# Patient Record
Sex: FEMALE | Race: WHITE
[De-identification: names, ages, dates, MRNs, and addresses within clinical notes are randomized per-mention and may not be internally consistent; named-entity substitution may affect disease eponyms.]

---

## 2021-10-04 ENCOUNTER — HOSPITAL ENCOUNTER (EMERGENCY)
Dept: HOSPITAL 56 - MW.ED | Age: 21
Discharge: HOME | End: 2021-10-04
Payer: COMMERCIAL

## 2021-10-04 DIAGNOSIS — E28.2: Primary | ICD-10-CM

## 2021-10-04 LAB
BUN SERPL-MCNC: 10 MG/DL (ref 7–18)
CHLORIDE SERPL-SCNC: 102 MMOL/L (ref 98–107)
CO2 SERPL-SCNC: 25.7 MMOL/L (ref 21–32)
GLUCOSE SERPL-MCNC: 160 MG/DL (ref 74–106)
LIPASE SERPL-CCNC: 36 U/L (ref 73–393)
POTASSIUM SERPL-SCNC: 3.9 MMOL/L (ref 3.5–5.1)
SODIUM SERPL-SCNC: 141 MMOL/L (ref 136–145)

## 2021-10-04 NOTE — EDM.PDOC
<Sandip Tucker - Last Filed: 10/04/21 05:07>





ED HPI GENERAL MEDICAL PROBLEM





- General


Chief Complaint: Abdominal Pain


Stated Complaint: SEVERE ABDOMINAL PAIN


Time Seen by Provider: 10/04/21 03:33


Source of Information: Reports: Patient


History Limitations: Reports: No Limitations





- History of Present Illness


INITIAL COMMENTS - FREE TEXT/NARRATIVE: 





Patient is a 20-year-old female presents today for right lower abdominal pain.  

Patient states that the pain started around 11 AM is been constant.  Patient 

states the pain is not made better or worse with anything.  But it did get worse

when she was laying flat.  Patient states she is having regular bowel movements 

urinary without issues and tolerating p.o.  She denies any other complaints.


  ** Right Lower Abdomen


Pain Score (Numeric/FACES): 7





- Related Data


                                    Allergies











Allergy/AdvReac Type Severity Reaction Status Date / Time


 


No Known Allergies Allergy   Verified 10/04/21 03:28











Home Meds: 


                                    Home Meds





. [No Known Home Meds]  10/04/21 [History]











ED ROS GENERAL





- Review of Systems


Review Of Systems: See Below


Constitutional: Reports: No Symptoms


HEENT: Reports: No Symptoms


Respiratory: Reports: No Symptoms


Cardiovascular: Reports: No Symptoms


Endocrine: Reports: No Symptoms


GI/Abdominal: Reports: Abdominal Pain


: Reports: No Symptoms


Musculoskeletal: Reports: No Symptoms


Skin: Reports: No Symptoms


Neurological: Reports: No Symptoms


Psychiatric: Reports: No Symptoms


Hematologic/Lymphatic: Reports: No Symptoms


Immunologic: Reports: No Symptoms





ED EXAM, GI/ABD





- Physical Exam


Exam: See Below


Exam Limited By: No Limitations


General Appearance: Alert, WD/WN, No Apparent Distress


Eyes: Bilateral: EOMI


Ears: Normal External Exam


Throat/Mouth: Normal Inspection


Head: Atraumatic


Neck: Normal Inspection


Respiratory/Chest: No Respiratory Distress, Lungs Clear, Normal Breath Sounds


Cardiovascular: Normal Peripheral Pulses, Regular Rate, Rhythm


GI/Abdominal Exam: Normal Bowel Sounds, Soft, Non-Tender


Extremities: Normal Inspection, Normal Range of Motion


Neurological: Alert, Oriented





Course





- Re-Assessments/Exams


Free Text/Narrative Re-Assessment/Exam: 





10/04/21 05:07


Patient has PCO S to have a good amount of pain.  We will obtain ultrasound as 7

 AM when the techs are here.  Patient remains vitally stable.





Departure





- Departure


Disposition: Home, Self-Care 01


Clinical Impression: 


 Polycystic ovarian disease








- Discharge Information


Instructions:  Polycystic Ovary Syndrome


Referrals: 


Porfirio Guevara MD [Primary Care Provider] - 


Forms:  ED Department Discharge


Additional Instructions: 


The following information is given to patients seen in the emergency department 

who are being discharged to home. This information is to outline your options 

for follow-up care. We provide all patients seen in our emergency department 

with a follow-up referral.





The need for follow-up, as well as the timing and circumstances, are variable 

depending upon the specifics of your emergency department visit.





If you don't have a primary care physician on staff, we will provide you with a 

referral. We always advise you to contact your personal physician following an 

emergency department visit to inform them of the circumstance of the visit and 

for follow-up with them and/or the need for any referrals to a consulting 

specialist.





The emergency department will also refer you to a specialist when appropriate. 

This referral assures that you have the opportunity for follow-up care with a 

specialist. All of these measure are taken in an effort to provide you with 

optimal care, which includes your follow-up.





Under all circumstances we always encourage you to contact your private 

physician who remains a resource for coordinating your care. When calling for 

follow-up care, please make the office aware that this follow-up is from your 

recent emergency room visit. If for any reason you are refused follow-up, please

contact the [facility] Emergency Department

at [phone] and asked to speak to the emergency department charge nurse.





Please follow up with your primary care physician. If you do not have a primary 

care physician, see below:


[facility]


[address] [place] [id number] [phone]





[facility]


[address] [place], [place] [id number] [phone]








[facility]


[address], [place] [address]


Phone: [phone]


Fax: [phone]








- Assessment/Plan


Plan: 





Patient is a 20-year-old female who presents today for lower right side 

abdominal pain.  Patient has no abdominal tenderness on exam patient looks well 

tolerating p.o. not febrile.  Will obtain labs UA and reassess patient.





<Lam Tamez - Last Filed: 10/04/21 10:15>





ED ROS GENERAL





- Review of Systems


Review Of Systems: Comprehensive ROS is negative, except as noted in HPI.





ED EXAM, GI/ABD





- Physical Exam


Exam: See Below





Course





- Vital Signs


Last Recorded V/S: 


                                Last Vital Signs











Temp  97.8 F   10/04/21 03:29


 


Pulse  112 H  10/04/21 03:29


 


Resp  17   10/04/21 03:29


 


BP  132/86   10/04/21 03:29


 


Pulse Ox  97   10/04/21 03:29














- Orders/Labs/Meds


Labs: 


                                Laboratory Tests











  10/04/21 10/04/21 10/04/21 Range/Units





  03:30 03:30 04:30 


 


WBC  12.92 H    (4.0-11.0)  K/uL


 


RBC  4.18 L    (4.30-5.90)  M/uL


 


Hgb  12.3    (12.0-16.0)  g/dL


 


Hct  37.3    (36.0-46.0)  %


 


MCV  89.2    (80.0-98.0)  fL


 


MCH  29.4    (27.0-32.0)  pg


 


MCHC  33.0    (31.0-37.0)  g/dL


 


RDW Std Deviation  46.7    (28.0-62.0)  fl


 


RDW Coeff of Enriqueta  14    (11.0-15.0)  %


 


Plt Count  240    (150-400)  K/uL


 


MPV  8.60    (7.40-12.00)  fL


 


Neut % (Auto)  78.5    (48.0-80.0)  %


 


Lymph % (Auto)  16.3    (16.0-40.0)  %


 


Mono % (Auto)  4.6    (0.0-15.0)  %


 


Eos % (Auto)  0.5    (0.0-7.0)  %


 


Baso % (Auto)  0.1    (0.0-1.5)  %


 


Neut # (Auto)  10.2 H    (1.4-5.7)  K/uL


 


Lymph # (Auto)  2.1    (0.6-2.4)  K/uL


 


Mono # (Auto)  0.6    (0.0-0.8)  K/uL


 


Eos # (Auto)  0.1    (0.0-0.7)  K/uL


 


Baso # (Auto)  0.0    (0.0-0.1)  K/uL


 


Nucleated RBC %  0.0    /100WBC


 


Nucleated RBCs #  0    K/uL


 


Sodium   141   (136-145)  mmol/L


 


Potassium   3.9   (3.5-5.1)  mmol/L


 


Chloride   102   ()  mmol/L


 


Carbon Dioxide   25.7   (21.0-32.0)  mmol/L


 


BUN   10   (7.0-18.0)  mg/dL


 


Creatinine   0.8   (0.6-1.0)  mg/dL


 


Est Cr Clr Drug Dosing   97.67   mL/min


 


Estimated GFR (MDRD)   > 60.0   ml/min


 


Glucose   160 H   ()  mg/dL


 


Calcium   8.0 L   (8.5-10.1)  mg/dL


 


Phosphorus   3.8   (2.6-4.7)  mg/dL


 


Magnesium   1.9   (1.8-2.4)  mg/dL


 


Total Bilirubin   0.8   (0.2-1.0)  mg/dL


 


AST   19   (15-37)  IU/L


 


ALT   41   (14-63)  IU/L


 


Alkaline Phosphatase   117 H   ()  U/L


 


Total Protein   7.8   (6.4-8.2)  g/dL


 


Albumin   3.9   (3.4-5.0)  g/dL


 


Globulin   3.9   (2.6-4.0)  g/dL


 


Albumin/Globulin Ratio   1.0   (0.9-1.6)  


 


Lipase   36 L   ()  U/L


 


Urine Color     


 


Urine Appearance     


 


Urine pH     (5.0-8.0)  


 


Ur Specific Gravity     (1.001-1.035)  


 


Urine Protein     (NEGATIVE)  mg/dL


 


Urine Glucose (UA)     (NEGATIVE)  mg/dL


 


Urine Ketones     (NEGATIVE)  mg/dL


 


Urine Occult Blood     (NEGATIVE)  


 


Urine Nitrite     (NEGATIVE)  


 


Urine Bilirubin     (NEGATIVE)  


 


Urine Ictotest     


 


Urine Urobilinogen     (<2.0)  EU/dL


 


Ur Leukocyte Esterase     (NEGATIVE)  


 


Urine HCG, Qual    NEGATIVE  (NEGATIVE)  














  10/04/21 Range/Units





  04:30 


 


WBC   (4.0-11.0)  K/uL


 


RBC   (4.30-5.90)  M/uL


 


Hgb   (12.0-16.0)  g/dL


 


Hct   (36.0-46.0)  %


 


MCV   (80.0-98.0)  fL


 


MCH   (27.0-32.0)  pg


 


MCHC   (31.0-37.0)  g/dL


 


RDW Std Deviation   (28.0-62.0)  fl


 


RDW Coeff of Enriqueta   (11.0-15.0)  %


 


Plt Count   (150-400)  K/uL


 


MPV   (7.40-12.00)  fL


 


Neut % (Auto)   (48.0-80.0)  %


 


Lymph % (Auto)   (16.0-40.0)  %


 


Mono % (Auto)   (0.0-15.0)  %


 


Eos % (Auto)   (0.0-7.0)  %


 


Baso % (Auto)   (0.0-1.5)  %


 


Neut # (Auto)   (1.4-5.7)  K/uL


 


Lymph # (Auto)   (0.6-2.4)  K/uL


 


Mono # (Auto)   (0.0-0.8)  K/uL


 


Eos # (Auto)   (0.0-0.7)  K/uL


 


Baso # (Auto)   (0.0-0.1)  K/uL


 


Nucleated RBC %   /100WBC


 


Nucleated RBCs #   K/uL


 


Sodium   (136-145)  mmol/L


 


Potassium   (3.5-5.1)  mmol/L


 


Chloride   ()  mmol/L


 


Carbon Dioxide   (21.0-32.0)  mmol/L


 


BUN   (7.0-18.0)  mg/dL


 


Creatinine   (0.6-1.0)  mg/dL


 


Est Cr Clr Drug Dosing   mL/min


 


Estimated GFR (MDRD)   ml/min


 


Glucose   ()  mg/dL


 


Calcium   (8.5-10.1)  mg/dL


 


Phosphorus   (2.6-4.7)  mg/dL


 


Magnesium   (1.8-2.4)  mg/dL


 


Total Bilirubin   (0.2-1.0)  mg/dL


 


AST   (15-37)  IU/L


 


ALT   (14-63)  IU/L


 


Alkaline Phosphatase   ()  U/L


 


Total Protein   (6.4-8.2)  g/dL


 


Albumin   (3.4-5.0)  g/dL


 


Globulin   (2.6-4.0)  g/dL


 


Albumin/Globulin Ratio   (0.9-1.6)  


 


Lipase   ()  U/L


 


Urine Color  YELLOW  


 


Urine Appearance  CLEAR  


 


Urine pH  5.5  (5.0-8.0)  


 


Ur Specific Gravity  >= 1.030  (1.001-1.035)  


 


Urine Protein  NEGATIVE  (NEGATIVE)  mg/dL


 


Urine Glucose (UA)  NEGATIVE  (NEGATIVE)  mg/dL


 


Urine Ketones  NEGATIVE  (NEGATIVE)  mg/dL


 


Urine Occult Blood  NEGATIVE  (NEGATIVE)  


 


Urine Nitrite  NEGATIVE  (NEGATIVE)  


 


Urine Bilirubin  SMALL H  (NEGATIVE)  


 


Urine Ictotest  NEGATIVE  


 


Urine Urobilinogen  0.2  (<2.0)  EU/dL


 


Ur Leukocyte Esterase  NEGATIVE  (NEGATIVE)  


 


Urine HCG, Qual   (NEGATIVE)  














- Re-Assessments/Exams


Free Text/Narrative Re-Assessment/Exam: 





10/04/21 08:05


Care transitioned from Dr. Tucker pending ultrasonography results. 


10/04/21 10:15


Ultrasonography is inconclusive.  Patient has not had any abdominal pain for the

last couple of hours.  I recommend that she follow-up with her OB/GYN, return 

precautions including return of pain, nausea, vomiting, fevers were discussed.





Departure





- Departure


Time of Disposition: 10:15


Condition: Good





Sepsis Event Note (ED)





- Focused Exam


Vital Signs: 


                                   Vital Signs











  Temp Pulse Resp BP Pulse Ox


 


 10/04/21 03:29  97.8 F  112 H  17  132/86  97

## 2021-10-04 NOTE — US
INDICATION:



History of PCOS, right lower abdomen pain. Amenorrhea.



COMPARISON:



None.



TECHNIQUE:



2D gray scale and color Doppler images were acquired of the pelvis using a 

transabdominal and transvaginal approach. 



FINDINGS:



Limited visualization transvaginally. The uterus is anteverted in position. 

The uterus measures 7.4 cm in length by 4.0 cm in AP diameter by 2.2 cm in 

transverse dimension.  The myometrium has a normal uniform echotexture. The 

endometrial lining was not measured but does not appear thickened. 



The right ovary measures 6.0 x 3.4 x 3.5 cm in size (37 cc) and the left 

ovary measures 3.5 x 2.7 x 2.7 cm in size (14 cc). Blood flow not well seen 

within either ovary transabdominally. Large amount of free fluid in the 

pelvis greater than expected for physiologic. 



IMPRESSION:



1. Limited visualization transvaginally. 



2. Normal transabdominal appearance of the uterus. 



3. The ovaries are both prominent in size but larger on the right. Blood 

flow is not well seen within either ovary transabdominally. Torsion cannot 

be excluded. 



4. Large amount of free fluid in the pelvis of uncertain etiology.



Dictated by Anntia Smith MD @ 10/4/2021 10:08:05 AM



(Electronically Signed)